# Patient Record
Sex: MALE | Employment: FULL TIME | URBAN - METROPOLITAN AREA
[De-identification: names, ages, dates, MRNs, and addresses within clinical notes are randomized per-mention and may not be internally consistent; named-entity substitution may affect disease eponyms.]

---

## 2023-11-01 ENCOUNTER — OFFICE VISIT (OUTPATIENT)
Dept: FAMILY MEDICINE | Facility: CLINIC | Age: 68
End: 2023-11-01

## 2023-11-01 VITALS
DIASTOLIC BLOOD PRESSURE: 70 MMHG | BODY MASS INDEX: 27.49 KG/M2 | OXYGEN SATURATION: 97 % | TEMPERATURE: 97.8 F | HEIGHT: 70 IN | SYSTOLIC BLOOD PRESSURE: 118 MMHG | RESPIRATION RATE: 18 BRPM | HEART RATE: 78 BPM | WEIGHT: 192 LBS

## 2023-11-01 DIAGNOSIS — Z02.89 ENCOUNTER FOR EXAMINATION REQUIRED BY DEPARTMENT OF TRANSPORTATION (DOT): Primary | ICD-10-CM

## 2023-11-01 LAB
ALBUMIN UR-MCNC: NEGATIVE MG/DL
APPEARANCE UR: CLEAR
BILIRUB UR QL STRIP: NEGATIVE
COLOR UR AUTO: YELLOW
GLUCOSE UR STRIP-MCNC: NEGATIVE MG/DL
HGB UR QL STRIP: NEGATIVE
KETONES UR STRIP-MCNC: NEGATIVE MG/DL
LEUKOCYTE ESTERASE UR QL STRIP: NEGATIVE
NITRATE UR QL: NEGATIVE
PH UR STRIP: 7 [PH] (ref 5–7)
SP GR UR STRIP: 1.01 (ref 1–1.03)
UROBILINOGEN UR STRIP-ACNC: 0.2 E.U./DL

## 2023-11-01 PROCEDURE — 81003 URINALYSIS AUTO W/O SCOPE: CPT | Performed by: FAMILY MEDICINE

## 2023-11-01 PROCEDURE — 99499 UNLISTED E&M SERVICE: CPT | Performed by: FAMILY MEDICINE

## 2023-11-01 NOTE — PROGRESS NOTES
Form [id number]                                  [facility] No. [id number]  Expiration Date: 3/31/2025  MEDICAL EXAMINATION REPORT FORM  (FOR  MEDICAL CERTIFICATION)    SECTION 1.  Information (to be filled out by )    PERSONAL INFORMATION    Last Name: Kimberlyn  First Name: Myron Baca Initial: MIL  :  1955       Age: 67        Street Address: [address] [place] [address]   City: [place]   State/Province: [place]   Zip Code: [address]  's License Number: [id number]      Issuing State/Province: [place]       Phone: [phone]      E-mail (optional): [email or web address]  CLP/CDL Applicant Friend*: no   ID Verified by**:  's License   Has your [employer] medical certificate ever been denied or issued for less than 2 years? NO       TESTING       Blood Pressure  Blood Pressure: 118 Systolic  70 Diastolic  Sitting yes  Second Reading (optional) NA  Other Testing if indicated    *       Urinalysis  Urinalysis is required. Numerical readings must be recorded.  Urine Specimen Specific Gravity Protein Blood Sugar    1.015 NEGATIVE NEGATIVE NEGATIVE   Protein, blood or sugar in the urine may be an indication for further testing to rule out any underlying medical problem.       PMH - Prostate cancer,  Htn, GERD    PSH - Prostatectomy     Meds Omeprazole 20 mg Daily, Lisinopril 10 mg Daily    NKDA    PHYSICAL EXAMINATION  The presence of a certain condition may not necessarily disqualify a , particularly if the condition is controlled adequately, is not likely to worsen, or is readily amenable to treatment. Even if a condition does not disqualify a , the Medical Examiner may consider deferring the  temporarily. Also, the  should be advised to take the necessary steps to correct the condition as soon as possible, particularly if neglecting the condition, could result in more serious illness that might affect driving.      Discuss any abnormal answers in detail  in the space below and indicate whether it would affect the 's ability to operate a CMV. Enter applicable item number before each comment.    SEE SCANNED FORMS            Please complete only one of the following (Federal or State) Medical Examiner Determination sections:      MEDICAL EXAMINER DETERMINATION (State)  Use this section for examinations performed in accordance with the Federal Motor Carrier Safety Regulations (49 ..49) with any applicable State variances (which will only be valid for intrastate operations):    [  ] Does not meet standards in 49 .41 with any applicable State variances (specify reason) __________________________________  [  ] Meets standards in 49 .41 with any applicable State variances  [ X ] Meets standards, but periodic monitoring required (specify reason): ____Htn_____________________________________________________         qualified for:   [  ] 3 months        [  ] 6 months        [ X ] 1 year        [  ] other (specify type): ___________________________________  [ X ] Wearing corrective lenses       [ X ] Wearing hearing aid        [  ] Accompanied by a waiver/exemption (specify type): __________________  [  ] Accompanied by a Skill Performance Evaluation (SPE) Certificate    [  ]  Grandfathered from State requirements (State)            If the  meets the standards outlined in 49 .41, with applicable State variances, then complete a Medical Examiner's Certificate, as appropriate.     I have performed this evaluation for certification. I have personally reviewed all available records and recorded information pertaining to this evaluation, and attest that to the best of my knowledge, I believe it to be true and correct.    Medical Examiner's Signature: _________________________________________                                                                                   (if printed)    Medical Examiner's Name: Dean Gregory  MD  Medical Examiner's Address:   [facility] [address] [facility] [id number] [phone]  Dept: [phone]    Date Certificate Signed: 11/1/2023     Medical Examiner's State License, Certificate, or Registration Number: [id number]    Issuing [place]    [ X ] [email or web address]   [  ] D.O.   [  ] Physician Assistant   [  ] Chiropractor   [  ] Advanced Practice Nurse  [  ] Other Practitioner (specify) __________________________________________________    National Registry Number:  [id number]                Medical Examiner's Certificate Expiration Date: 11/1/2024                                    Date submitted to registry: 11/01/2023  Submitted by: Jyoti Brink/ELIZABETH